# Patient Record
(demographics unavailable — no encounter records)

---

## 2025-05-29 NOTE — HISTORY OF PRESENT ILLNESS
[FreeTextEntry1] : This visit was provided via telehealth using real-time 2-way audio-visual technology.    The patient was located at HOME in [place] at the time of the visit.   The provider was located at HOME in [place] at the time of the visit.   The patient and provider participated in the telehealth encounter.   Verbal consent for telehealth services was given by the patient.  Patient admitted to  and recently discharged on May 13, 2025 - here for post- hospitalization f/u   Ms. Perla has a PMHx of HTN, celiac, left femur fracture s/p mulitple surgeries who presented to the  ED with complaints of diffuse body and joint aches/  she had progressive difficult abmulating over a month and came to the hospital for excruciating and constant pain.     - hospitalization c/w UTI and received abx with improvement in symptoms  - ESR 31 - which was age appropriate - polymyalgias were felt to be infectious in etiology given imrpovement in symtposm with abx  - here to review b/w   Since hospitalization - continued to improve during the hospitaliztion  - she felt like as soon as it kicked in she immediated felt betteer - pep improved  - the pain lingered a bit longer however - was being treated with motrin for it -  - had a PT evaluation -  no need for cane or crutch  - the knee is still problematic - can feel the ligament behind the knee is pulling - will be going to the orthopedist for this  - went off the IV abx and went onto the po - cipro.  finished it about 7 days after the hospitalization.   started to feel sick again and the pain worsened again.  pain centered the upper arms and the legs from time to time. - went to see her PCP on MOnday - but hasnt had terrinle pain like before  - using heat and taking the Motrin every 4 hours  - mornings are rought as are the evenings  - today more strength and mobility  - shoulders, neck and the upper arm - no weakness  - hips are fine and the low back is fine - thighs are okay  - had is going down - at times looks more swellign   Denies visual changes, scalp pain, jaw claudication, headache.  There is no double vision, constitutional symptoms or weight loss.  ROS - has some urgency, frequency and wonders if has a persistent UTI  - no fever, chills - has a plan to go to a urologist  - has had 4 cultures and they all came back contaminated - theywere taken in differet places at different labs

## 2025-05-29 NOTE — DATA REVIEWED
[FreeTextEntry1] :  WBC returned to normal (13->10 ->8) CCP negative  CPK normal  RF, CCP, KEVON< dsDNA all normal CRP 25 more than ESR 31

## 2025-05-29 NOTE — ASSESSMENT
[FreeTextEntry1] : Patient admitted to  and recently discharged on May 13, 2025 - here for post- hospitalization f/u   f/u Tuesday Antonietta 10 at 7:30 AM in [place] - paitent aware f/u Thursday TEB June 12 at 1pm - patient aware  reviewed results and hospital course as part of this encounger   Ms. Perla has a PMHx of HTN, celiac, left femur fracture s/p mulitple surgeries who presented to the  ED with complaints of diffuse body and joint aches/  she had progressive difficult abmulating over a month and came to the hospital for excruciating and constant pain.     - hospitalization c/w UTI and received abx with improvement in symptoms  - ESR 31 - which was age appropriate - polymyalgias were felt to be infectious in etiology given imrpovement in symtposm with abx  - here to review b/w  - parvovirusm IgM negative -  - resutls and hospital course reviewed   WBC returned to normal (13->10 ->8) CCP negative  CPK normal  RF, CCP, KEVON< dsDNA all normal CRP 25 more than ESR 31  # OA Xray knee - tricompartment OA -- start tylenol arthritis   # increased CPR  likely 2/2 infection while in hospital -- repeat CRP    # UTI  -- recurrent symptoms  -- urology f/u -- clean catch culture   # OA, knee  xray c/w tricompartmental OA  -- t/c injeciton of the knee  -- f/u ortho as well    ======================== More than 50% of the encounter was spent counseling the patient on differential, workup, disease course and treatment/management.  Education was provided to the patient during this encounter.  All questions and concerns were addressed and answered.   The patient verbalized understanding and agreed to the plan.   Patient has been instructed to call for an appointment if new symptoms develop. Patient has been instructed to make a follow-up appointment in 3 months.   Time spent on the encounter included, but is not limited to, preparing to see the patient, obtaining and/or reviewing separately obtained history, performing the evaluation, counseling and educating, independently interpreting results with communication to patient, order placement, referring and/or communicating with other health professionals as described, and documenting clinical information in the electronic health record

## 2025-05-29 NOTE — ASSESSMENT
[FreeTextEntry1] : Patient admitted to  and recently discharged on May 13, 2025 - here for post- hospitalization f/u   f/u Tuesday Antonietta 10 at 7:30 AM in [place] - paitent aware f/u Thursday TEB June 12 at 1pm - patient aware  reviewed results and hospital course as part of this encounger   Ms. Perla has a PMHx of HTN, celiac, left femur fracture s/p mulitple surgeries who presented to the  ED with complaints of diffuse body and joint aches/  she had progressive difficult abmulating over a month and came to the hospital for excruciating and constant pain.     - hospitalization c/w UTI and received abx with improvement in symptoms  - ESR 31 - which was age appropriate - polymyalgias were felt to be infectious in etiology given imrpovement in symtposm with abx  - here to review b/w  - parvovirusm IgM negative -  - resutls and hospital course reviewed   WBC returned to normal (13->10 ->8) CCP negative  CPK normal  RF, CCP, KEVON< dsDNA all normal CRP 25 more than ESR 31  # OA Xray knee - tricompartment OA -- start tylenol arthritis   # increased CPR  likely 2/2 infection while in hospital -- repeat CRP    # UTI  -- recurrent symptoms  -- urology f/u -- clean catch culture   # OA, knee  xray c/w tricompartmental OA  -- t/c injeciton of the knee  -- f/u ortho as well    ======================== More than 50% of the encounter was spent counseling the patient on differential, workup, disease course and treatment/management.  Education was provided to the patient during this encounter.  All questions and concerns were addressed and answered.   The patient verbalized understanding and agreed to the plan.   Patient has been instructed to call for an appointment if new symptoms develop. Patient has been instructed to make a follow-up appointment in 3 months.   Time spent on the encounter included, but is not limited to, preparing to see the patient, obtaining and/or reviewing separately obtained history, performing the evaluation, counseling and educating, independently interpreting results with communication to patient, order placement, referring and/or communicating with other health professionals as described, and documenting clinical information in the electronic health record

## 2025-05-29 NOTE — DATA REVIEWED
[FreeTextEntry1] :  WBC returned to normal (13->10 ->8) CCP negative  CPK normal  RF, CCP, KEVON< dsDNA all normal CRP 25 more than ESR 31 Schizoaffective disorder, bipolar type     Schizoaffective disorder, bipolar type     Schizoaffective disorder, bipolar type

## 2025-05-29 NOTE — ASSESSMENT
[FreeTextEntry1] : Patient admitted to  and recently discharged on May 13, 2025 - here for post- hospitalization f/u   f/u Tuesday Antonietta 10 at 7:30 AM in [place] - paitent aware f/u Thursday TEB June 12 at 1pm - patient aware  reviewed results and hospital course as part of this encounger   Ms. Perla has a PMHx of HTN, celiac, left femur fracture s/p mulitple surgeries who presented to the  ED with complaints of diffuse body and joint aches/  she had progressive difficult abmulating over a month and came to the hospital for excruciating and constant pain.     - hospitalization c/w UTI and received abx with improvement in symptoms  - ESR 31 - which was age appropriate - polymyalgias were felt to be infectious in etiology given imrpovement in symtposm with abx  - here to review b/w  - parvovirusm IgM negative -  - resutls and hospital course reviewed   WBC returned to normal (13->10 ->8) CCP negative  CPK normal  RF, CCP, KEVON< dsDNA all normal CRP 25 more than ESR 31  # OA Xray knee - tricompartment OA -- start tylenol arthritis   # increased CPR  likely 2/2 infection while in hospital -- repeat CRP    # UTI  -- recurrent symptoms  -- urology f/u -- clean catch culture   # OA, knee  xray c/w tricompartmental OA  -- t/c injeciton of the knee  -- f/u ortho as well    ======================== More than 50% of the encounter was spent counseling the patient on differential, workup, disease course and treatment/management.  Education was provided to the patient during this encounter.  All questions and concerns were addressed and answered.   The patient verbalized understanding and agreed to the plan.   Patient has been instructed to call for an appointment if new symptoms develop. Patient has been instructed to make a follow-up appointment in 3 months.   Time spent on the encounter included, but is not limited to, preparing to see the patient, obtaining and/or reviewing separately obtained history, performing the evaluation, counseling and educating, independently interpreting results with communication to patient, order placement, referring and/or communicating with other health professionals as described, and documenting clinical information in the electronic health record

## 2025-06-11 NOTE — ASSESSMENT
[FreeTextEntry1] : Ms. Perla has a PMHx of HTN, celiac, left femur fracture s/p mulitple surgeries who presented to the  ED with complaints of diffuse body and joint aches/  she had progressive difficult abmulating over a month and came to the hospital for excruciating and constant pain.      # polymyalgia hospitalization with polymyalgia - ESR at the time at 31 and symptoms improved with abx (for concurrent UTI) - post hospitalization symptoms recurred off abx and ESR increased  -- d/w patient possible PMR  -- however also with Casts and yeast in the urine  -- rheumatic w/u in hospital negative including CCP negative, CPK normal, RF, CCP, KEVON< dsDNA all normal, CRP 25 more than ESR 31 -> 70 (post-hospitalization) -- d/w patient urologic f/u  -- d/w patient steroid trial empiric - r/b/a discussed - however patient declines at present  -- will repeat the ESR and confirm  -- will repeat the casts as well to confirm  # OA Xray knee - tricompartment OA -- start tylenol arthritis   # increased CRP likely 2/2 infection while in hospital -- repeat CRP   # UTI  -- urology f/u -- clean catch culture was negative  # OA, knee  xray c/w tricompartmental OA  -- t/c injeciton of the knee  -- f/u ortho as well    ======================== More than 50% of the encounter was spent counseling the patient on differential, workup, disease course and treatment/management.  Education was provided to the patient during this encounter.  All questions and concerns were addressed and answered.   The patient verbalized understanding and agreed to the plan.   Patient has been instructed to call for an appointment if new symptoms develop. Patient has been instructed to make a follow-up appointment in 3 months.   Time spent on the encounter included, but is not limited to, preparing to see the patient, obtaining and/or reviewing separately obtained history, performing the evaluation, counseling and educating, independently interpreting results with communication to patient, order placement, referring and/or communicating with other health professionals as described, and documenting clinical information in the electronic health record

## 2025-06-11 NOTE — ASSESSMENT
[FreeTextEntry1] : Ms. Perla has a PMHx of HTN, celiac, left femur fracture s/p mulitple surgeries who presented to the  ED with complaints of diffuse body and joint aches/  she had progressive difficult ambulating over a month and came to the hospital for excruciating and constant pain.      # polymyalgia hospitalization with polymyalgia - ESR at the time at 31 and symptoms improved with abx (for concurrent UTI) - post hospitalization symptoms recurred off abx and ESR increased  --Initial concern for polymyalgia related to UTI, however pt no longer with UTI symptoms and with negative culture. Now with persistent shoulder pain in setting of elevated inflammatory markers. d/w patient possible PMR  -- rheumatic w/u in hospital negative including CCP negative, CPK normal, RF, CCP, KEVON< dsDNA all normal, CRP 25 more than ESR 31 -> 70 (post-hospitalization) -- Repeat inflammatory markers.  -- Bilateral US of shoulders to assess for inflammatory changes.  -- Pending US results, will further discuss starting steroids for potential PMR   -- Quant gold and hepatitis labs ordered for potential steroids sparing options. Pt hesitant about MTX (had friend with side effects). Denies hx of diverticulitis (in setting of potential actemra use) -- declines steroid at this time but is aware this may be a possibilty - also gave overview of steroid sparing options  -- patient interested in holistic approach  --Will also test for other causes such as celiac dx   # OA Xray knee - tricompartment OA -- start tylenol arthritis   # increased CPR  likely 2/2 infection while in hospital vs c/f PMR as above?  -- repeat CRP   # UTI  -- urology f/u -- clean catch culture was negative  # OA, knee  xray c/w tricompartmental OA  -- f/u ortho   ======================== More than 50% of the encounter was spent counseling the patient on differential, workup, disease course and treatment/management.  Education was provided to the patient during this encounter.  All questions and concerns were addressed and answered.   The patient verbalized understanding and agreed to the plan.   Patient has been instructed to call for an appointment if new symptoms develop. Patient has been instructed to make a follow-up appointment in 1 week    Time spent on the encounter included, but is not limited to, preparing to see the patient, obtaining and/or reviewing separately obtained history, performing the evaluation, counseling and educating, independently interpreting results with communication to patient, order placement, referring and/or communicating with other health professionals as described, and documenting clinical information in the electronic health record

## 2025-06-11 NOTE — HISTORY OF PRESENT ILLNESS
[FreeTextEntry1] : Ms. Perla has a PMHx of HTN, celiac, left femur fracture s/p mulitple surgeries who presented to the  ED with complaints of diffuse body and joint aches/  she had progressive difficult ambulating over a month and came to the hospital for excruciating and constant pain.      INTERVAL Hx Continues to have worsening pain, particularly in the bilateral shoulders, neck, R hip and L knee.  Pain worse in the AM, doesn't improve until the evening.   in terms of  he urine  - Denies dysuria   in terms of other symptoms  - no fevers, no chills, but endorses worsening fatigue  - feels incapacitated from the pain - the lack of mobility in the arms and the legs  - pain in the shoulders, upper arms, lower arms, hands/fingers, legs Denies visual changes, scalp pain, jaw claudication, headache.   ROS - Denies urgency, frequency and wonders  - no fever, chills

## 2025-06-11 NOTE — PHYSICAL EXAM
[General Appearance - In No Acute Distress] : in no acute distress [General Appearance - Alert] : alert [Oriented To Time, Place, And Person] : oriented to person, place, and time [Affect] : the affect was normal [Impaired Insight] : insight and judgment were intact [Sclera] : the sclera and conjunctiva were normal [Extraocular Movements] : extraocular movements were intact [Respiration, Rhythm And Depth] : normal respiratory rhythm and effort [Auscultation Breath Sounds / Voice Sounds] : lungs were clear to auscultation bilaterally [Heart Rate And Rhythm] : heart rate was normal and rhythm regular [Heart Sounds] : normal S1 and S2 [Arterial Pulses Carotid] : carotid pulses were normal with no bruits [Musculoskeletal - Swelling] : no joint swelling seen [] : no rash [FreeTextEntry1] : bilateral shoulders with limited ROM, L worse than R. R hip with full ROM, no pain on exam, L knee with no active synovitis  Additional Notes: .\\nOnexton gel in the morning\\nRetin A 0.05% cream at night\\n\\nDoxycycline 100 mg twice a day x 7 days

## 2025-06-11 NOTE — PHYSICAL EXAM
[General Appearance - In No Acute Distress] : in no acute distress [General Appearance - Alert] : alert [Oriented To Time, Place, And Person] : oriented to person, place, and time [Impaired Insight] : insight and judgment were intact [Affect] : the affect was normal [Sclera] : the sclera and conjunctiva were normal [Extraocular Movements] : extraocular movements were intact [Respiration, Rhythm And Depth] : normal respiratory rhythm and effort [Auscultation Breath Sounds / Voice Sounds] : lungs were clear to auscultation bilaterally [Heart Rate And Rhythm] : heart rate was normal and rhythm regular [Heart Sounds] : normal S1 and S2 [Musculoskeletal - Swelling] : no joint swelling seen [Arterial Pulses Carotid] : carotid pulses were normal with no bruits [] : no rash [FreeTextEntry1] : bilateral shoulders with limited ROM, L worse than R. R hip with full ROM, no pain on exam, L knee with no active synovitis

## 2025-06-11 NOTE — HISTORY OF PRESENT ILLNESS
[FreeTextEntry1] : This telephonic visit was provided via audio only technology.  The patient was located at home [place] at the time of the visit. The provider was located at home [place] at the time of the visit. The patient and Provider participated in the telephonic visit.  Verbal consent for telephonic services was given by the patient.  Ms. Perla has a PMHx of HTN, celiac, left femur fracture s/p mulitple surgeries who presented to the  ED with complaints of diffuse body and joint aches/  she had progressive difficult ambulating over a month and came to the hospital for excruciating and constant pain.      INTERVAL Hx  feels the same as she did on the way back to the hospital  - had other tests in the meantime  -  not doing well again - had a doppler of the left legs - still bothering her (was negative)  - was also sent for an RA panel - that is still pending  in terms of  he urine  - waiting to get an appt with the urology to evaluate the casts and yeast   in terms of other symptoms  - no fevers, no chills  - feels incapacitated from afia pain - the lack of mobility in the arms and the legs  - pain in the shoulders, upper arms, lower arms, hands/fingers, legs  Denies visual changes, scalp pain, jaw claudication, headache.  There is no double vision, constitutional symptoms or weight loss.  ROS - has some urgency, frequency and wonders if has a persistent UTI  - no fever, chills - has a plan to go to a urologist  - has had 4 cultures and they all came back contaminated - theywere taken in differet places at different labs

## 2025-06-11 NOTE — PHYSICAL EXAM
[General Appearance - In No Acute Distress] : in no acute distress [Oriented To Time, Place, And Person] : oriented to person, place, and time [Impaired Insight] : insight and judgment were intact [Affect] : the affect was normal [General Appearance - Alert] : alert